# Patient Record
Sex: FEMALE | Race: WHITE | NOT HISPANIC OR LATINO | Employment: PART TIME | URBAN - METROPOLITAN AREA
[De-identification: names, ages, dates, MRNs, and addresses within clinical notes are randomized per-mention and may not be internally consistent; named-entity substitution may affect disease eponyms.]

---

## 2019-09-03 ENCOUNTER — HOSPITAL ENCOUNTER (EMERGENCY)
Facility: MEDICAL CENTER | Age: 19
End: 2019-09-04
Attending: EMERGENCY MEDICINE

## 2019-09-03 DIAGNOSIS — F19.90 IVDU (INTRAVENOUS DRUG USER): ICD-10-CM

## 2019-09-03 DIAGNOSIS — M79.89 SWELLING OF LEFT HAND: ICD-10-CM

## 2019-09-03 SDOH — HEALTH STABILITY: MENTAL HEALTH: HOW OFTEN DO YOU HAVE A DRINK CONTAINING ALCOHOL?: NEVER

## 2019-09-04 VITALS
DIASTOLIC BLOOD PRESSURE: 72 MMHG | HEIGHT: 63 IN | SYSTOLIC BLOOD PRESSURE: 106 MMHG | HEART RATE: 60 BPM | WEIGHT: 126.54 LBS | BODY MASS INDEX: 22.42 KG/M2 | OXYGEN SATURATION: 100 % | TEMPERATURE: 97.9 F | RESPIRATION RATE: 18 BRPM

## 2019-09-04 PROCEDURE — 700102 HCHG RX REV CODE 250 W/ 637 OVERRIDE(OP)

## 2019-09-04 PROCEDURE — 99284 EMERGENCY DEPT VISIT MOD MDM: CPT

## 2019-09-04 PROCEDURE — A9270 NON-COVERED ITEM OR SERVICE: HCPCS

## 2019-09-04 RX ORDER — SULFAMETHOXAZOLE AND TRIMETHOPRIM 800; 160 MG/1; MG/1
TABLET ORAL
Status: COMPLETED
Start: 2019-09-04 | End: 2019-09-04

## 2019-09-04 RX ORDER — CEPHALEXIN 250 MG/1
500 CAPSULE ORAL ONCE
Status: COMPLETED | OUTPATIENT
Start: 2019-09-04 | End: 2019-09-04

## 2019-09-04 RX ORDER — SULFAMETHOXAZOLE AND TRIMETHOPRIM 800; 160 MG/1; MG/1
1 TABLET ORAL ONCE
Status: COMPLETED | OUTPATIENT
Start: 2019-09-04 | End: 2019-09-04

## 2019-09-04 RX ORDER — CEPHALEXIN 250 MG/1
CAPSULE ORAL
Status: COMPLETED
Start: 2019-09-04 | End: 2019-09-04

## 2019-09-04 RX ORDER — SULFAMETHOXAZOLE AND TRIMETHOPRIM 800; 160 MG/1; MG/1
1 TABLET ORAL 2 TIMES DAILY
Qty: 20 TAB | Refills: 0 | Status: SHIPPED | OUTPATIENT
Start: 2019-09-04 | End: 2019-09-14

## 2019-09-04 RX ORDER — CEPHALEXIN 500 MG/1
500 CAPSULE ORAL 4 TIMES DAILY
Qty: 40 CAP | Refills: 0 | Status: SHIPPED | OUTPATIENT
Start: 2019-09-04 | End: 2019-09-14

## 2019-09-04 RX ORDER — CEPHALEXIN 250 MG/1
CAPSULE ORAL
Status: DISCONTINUED
Start: 2019-09-04 | End: 2019-09-04 | Stop reason: HOSPADM

## 2019-09-04 RX ADMIN — SULFAMETHOXAZOLE AND TRIMETHOPRIM 1 TABLET: 800; 160 TABLET ORAL at 00:27

## 2019-09-04 RX ADMIN — CEPHALEXIN 500 MG: 250 CAPSULE ORAL at 00:27

## 2019-09-04 NOTE — ED NOTES
Discharge instructions given and discussed. RX for ABX given and pt educated to take full course until complete. Pt educated to come back to ER for new or worsening symptoms, worsening  swelling and redness up the arm including streaks and follow up with PCP as instructed. Pt verbalized understanding. VSS. Pt  Discharged in stable condition.

## 2019-09-04 NOTE — ED NOTES
"Pt complains of left hand swelling after \" shooting up heroin\", left hand is swollen and red. CMS intact. Pt is unable to make fist but able to move fingers.   "

## 2019-09-04 NOTE — ED PROVIDER NOTES
"ED Provider Note    CHIEF COMPLAINT  Chief Complaint   Patient presents with   • Hand Swelling     Left        HPI    Primary care provider: No primary care provider on file.   History obtained from: Patient  History limited by: None     Phoenix Masters is a 19 y.o. female who presents to the ED with boyfriend complaining of sudden onset of left hand swelling right after injecting in the area with heroin shortly prior to arrival.  Patient states that she never shares needles.  She denies any fever/chills/shortness of breath or difficulty breathing/weakness or sensory change.  She is right-hand dominant.  She denies possibility of pregnancy.  She reports slight nausea but no vomiting.    REVIEW OF SYSTEMS  Please see HPI for pertinent positives/negatives.     PAST MEDICAL HISTORY  No past medical history on file.     SURGICAL HISTORY  History reviewed. No pertinent surgical history.     SOCIAL HISTORY  Social History     Tobacco Use   • Smoking status: Current Every Day Smoker     Packs/day: 1.00     Types: Cigarettes   • Smokeless tobacco: Never Used   Substance and Sexual Activity   • Alcohol use: Never     Frequency: Never   • Drug use: Yes     Types: Intravenous     Comment: Heroin   • Sexual activity: Not on file        FAMILY HISTORY  History reviewed. No pertinent family history.     CURRENT MEDICATIONS  Home Medications    **Home medications have not yet been reviewed for this encounter**          ALLERGIES  Allergies   Allergen Reactions   • Other Drug      minacyclin        PHYSICAL EXAM  VITAL SIGNS: /76   Pulse 71   Temp 36.6 °C (97.9 °F) (Temporal)   Resp 18   Ht 1.6 m (5' 3\")   Wt 57.4 kg (126 lb 8.7 oz)   LMP 08/31/2019   SpO2 98%   BMI 22.42 kg/m²  @TOMAS[[id number]::@     Pulse ox interpretation: 98% I interpret this pulse ox as normal     Constitutional: Well developed, well nourished, alert in no apparent distress, nontoxic appearance    HENT: No external signs of trauma, normocephalic, " oropharynx moist and clear, nose normal    Eyes: PERRL, conjunctiva without erythema, no discharge, no icterus    Neck: Soft and supple, trachea midline, no stridor, no tenderness, no LAD, no JVD, good ROM    Cardiovascular: Regular rate and rhythm, no murmurs/rubs/gallops, strong distal pulses and good perfusion    Thorax & Lungs: No respiratory distress, CTAB   Abdomen: Soft, nontender, nondistended, no guarding, no rebound, normal BS    Back: No CVAT    Extremities: No cyanosis, mild diffuse swelling of left hand without apparent tenderness to palpation/tenseness/warm/crepitus/fluctuance, no gross deformity, good ROM of left hand but with mild discomfort, no tenderness, intact distal pulses with brisk cap refill    Skin: Warm, dry, no pallor/cyanosis, no rash noted    Lymphatic: No lymphadenopathy noted    Neuro: A/O times 3, no focal deficits noted    Psychiatric: Cooperative, normal mood and affect, normal judgement, appropriate for clinical situation        DIAGNOSTIC STUDIES / PROCEDURES        LABS  All labs reviewed by me.     No results found for this or any previous visit.     RADIOLOGY  The radiologist's interpretation of all radiological studies have been reviewed by me.     No orders to display          COURSE & MEDICAL DECISION MAKING  Nursing notes, VS, PMSFHx reviewed in chart.     Review of past medical records shows the patient without prior visits to this ED.      Differential diagnoses considered include but are not limited to: Infiltration of heroin, IV drug use, cellulitis    History and physical exam as above.  This is a pleasant well-appearing patient in no acute distress and nontoxic in appearance with left hand swelling after injecting with heroin.  Although no apparent clinical signs of infection, patient is concerned about infection and given the high risk with IV drug use patient will be started on Bactrim and Keflex.  No evidence for compartment syndrome or vascular occlusion and low  clinical suspicion at this time for necrotizing fasciitis or abscess.  I discussed with patient worrisome signs and symptoms and return to ED precautions.  She was encouraged on seeking help for her drug use and outpatient follow-up.  She verbalized understanding and agreed with plan of care with no further questions or concerns.      The patient is referred to a primary physician for blood pressure management, diabetic screening, and for all other preventative health concerns.       FINAL IMPRESSION  1. Swelling of left hand Acute   2. IVDU (intravenous drug user) Active          DISPOSITION  Patient will be discharged home in stable condition.       FOLLOW UP  [address] [address]  [phone]  Call today      [facility], Emergency Dept  [address]  [facility] [id number] [phone]    If symptoms worsen         OUTPATIENT MEDICATIONS  New Prescriptions    CEPHALEXIN (KEFLEX) 500 MG CAP    Take 1 Cap by mouth 4 times a day for 10 days.    SULFAMETHOXAZOLE-TRIMETHOPRIM (BACTRIM DS) 800-160 MG TABLET    Take 1 Tab by mouth 2 times a day for 10 days.          Electronically signed by: Scot Murillo, 9/4/2019 12:01 AM      Portions of this record were made with voice recognition software.  Despite my review, spelling/grammar/context errors may still remain.  Interpretation of this chart should be taken in this context.

## 2021-05-17 ENCOUNTER — OFFICE VISIT (OUTPATIENT)
Dept: URGENT CARE | Facility: PHYSICIAN GROUP | Age: 21
End: 2021-05-17

## 2021-05-17 ENCOUNTER — HOSPITAL ENCOUNTER (OUTPATIENT)
Facility: MEDICAL CENTER | Age: 21
End: 2021-05-17
Attending: NURSE PRACTITIONER
Payer: MEDICAID

## 2021-05-17 VITALS
DIASTOLIC BLOOD PRESSURE: 70 MMHG | OXYGEN SATURATION: 96 % | RESPIRATION RATE: 14 BRPM | SYSTOLIC BLOOD PRESSURE: 112 MMHG | HEIGHT: 64 IN | WEIGHT: 150 LBS | HEART RATE: 81 BPM | TEMPERATURE: 98.2 F | BODY MASS INDEX: 25.61 KG/M2

## 2021-05-17 DIAGNOSIS — R39.15 URINARY URGENCY: ICD-10-CM

## 2021-05-17 DIAGNOSIS — N30.01 ACUTE CYSTITIS WITH HEMATURIA: ICD-10-CM

## 2021-05-17 DIAGNOSIS — R30.0 DYSURIA: ICD-10-CM

## 2021-05-17 DIAGNOSIS — R35.0 URINARY FREQUENCY: ICD-10-CM

## 2021-05-17 LAB
APPEARANCE UR: NORMAL
BILIRUB UR STRIP-MCNC: NORMAL MG/DL
COLOR UR AUTO: NORMAL
GLUCOSE UR STRIP.AUTO-MCNC: 250 MG/DL
KETONES UR STRIP.AUTO-MCNC: 15 MG/DL
LEUKOCYTE ESTERASE UR QL STRIP.AUTO: NORMAL
NITRITE UR QL STRIP.AUTO: NORMAL
PH UR STRIP.AUTO: 5 [PH] (ref 5–8)
PROT UR QL STRIP: 300 MG/DL
RBC UR QL AUTO: NORMAL
SP GR UR STRIP.AUTO: 1
UROBILINOGEN UR STRIP-MCNC: 8 MG/DL

## 2021-05-17 PROCEDURE — 81002 URINALYSIS NONAUTO W/O SCOPE: CPT | Performed by: NURSE PRACTITIONER

## 2021-05-17 PROCEDURE — 87086 URINE CULTURE/COLONY COUNT: CPT

## 2021-05-17 PROCEDURE — 99203 OFFICE O/P NEW LOW 30 MIN: CPT | Performed by: NURSE PRACTITIONER

## 2021-05-17 RX ORDER — NITROFURANTOIN 25; 75 MG/1; MG/1
100 CAPSULE ORAL EVERY 12 HOURS
Qty: 10 CAPSULE | Refills: 0 | Status: SHIPPED | OUTPATIENT
Start: 2021-05-17 | End: 2021-05-17

## 2021-05-17 RX ORDER — PHENAZOPYRIDINE HYDROCHLORIDE 200 MG/1
200 TABLET, FILM COATED ORAL 3 TIMES DAILY
Qty: 6 TABLET | Refills: 0 | Status: SHIPPED
Start: 2021-05-17 | End: 2021-05-19

## 2021-05-17 RX ORDER — PHENAZOPYRIDINE HYDROCHLORIDE 200 MG/1
200 TABLET, FILM COATED ORAL 3 TIMES DAILY
Qty: 6 TABLET | Refills: 0 | Status: SHIPPED | OUTPATIENT
Start: 2021-05-17 | End: 2021-05-17

## 2021-05-17 RX ORDER — NITROFURANTOIN 25; 75 MG/1; MG/1
100 CAPSULE ORAL EVERY 12 HOURS
Qty: 10 CAPSULE | Refills: 0 | Status: SHIPPED | OUTPATIENT
Start: 2021-05-17 | End: 2021-05-22

## 2021-05-17 ASSESSMENT — ENCOUNTER SYMPTOMS
NAUSEA: 1
FLANK PAIN: 0
CHILLS: 0
ABDOMINAL PAIN: 1
DIARRHEA: 1
MYALGIAS: 0
VOMITING: 0

## 2021-05-17 NOTE — PROGRESS NOTES
Subjective:     Phoenix Masters is a 21 y.o. female who presents for Dysuria (x 3 days)      Dysuria   This is a new problem. The current episode started in the past 7 days (3 days ago Phoenix developed dysuria, urinary frequency and urgency. ). The problem occurs every urination. The problem has been gradually worsening. The quality of the pain is described as burning. The pain is at a severity of 7/10. There has been no fever. Associated symptoms include frequency, nausea and urgency. Pertinent negatives include no chills, flank pain, hematuria, possible pregnancy or vomiting. Treatments tried: Azo. The treatment provided mild relief.         Review of Systems   Constitutional: Negative for chills.   Gastrointestinal: Positive for abdominal pain, diarrhea and nausea. Negative for vomiting.   Genitourinary: Positive for dysuria, frequency and urgency. Negative for flank pain and hematuria.   Musculoskeletal: Negative for myalgias.       PMH: History reviewed. No pertinent past medical history.  ALLERGIES:   Allergies   Allergen Reactions   • Minocycline Hives   • Other Drug      minacyclin     SURGHX: History reviewed. No pertinent surgical history.  SOCHX:   Social History     Socioeconomic History   • Marital status: Single     Spouse name: Not on file   • Number of children: Not on file   • Years of education: Not on file   • Highest education level: Not on file   Occupational History   • Not on file   Tobacco Use   • Smoking status: Current Every Day Smoker     Packs/day: 1.00     Types: Cigarettes   • Smokeless tobacco: Never Used   Substance and Sexual Activity   • Alcohol use: Never   • Drug use: Yes     Types: Intravenous     Comment: Heroin   • Sexual activity: Not on file   Other Topics Concern   • Not on file   Social History Narrative   • Not on file     Social Determinants of Health     Financial Resource Strain:    • Difficulty of Paying Living Expenses:    Food Insecurity:    • Worried About Running  "Out of Food in the Last Year:    • Ran Out of Food in the Last Year:    Transportation Needs:    • Lack of Transportation (Medical):    • Lack of Transportation (Non-Medical):    Physical Activity:    • Days of Exercise per Week:    • Minutes of Exercise per Session:    Stress:    • Feeling of Stress :    Social Connections:    • Frequency of Communication with Friends and Family:    • Frequency of Social Gatherings with Friends and Family:    • Attends Oriental orthodox Services:    • Active Member of Clubs or Organizations:    • Attends Club or Organization Meetings:    • Marital Status:    Intimate Partner Violence:    • Fear of Current or Ex-Partner:    • Emotionally Abused:    • Physically Abused:    • Sexually Abused:      FH: History reviewed. No pertinent family history.      Objective:   /70   Pulse 81   Temp 36.8 °C (98.2 °F)   Resp 14   Ht 1.626 m (5' 4\")   Wt 68 kg (150 lb)   SpO2 96%   BMI 25.75 kg/m²     Physical Exam  Vitals and nursing note reviewed.   Constitutional:       General: She is not in acute distress.     Appearance: Normal appearance. She is not ill-appearing.   HENT:      Head: Normocephalic and atraumatic.      Right Ear: External ear normal.      Left Ear: External ear normal.      Nose: No congestion or rhinorrhea.      Mouth/Throat:      Mouth: Mucous membranes are moist.   Eyes:      Extraocular Movements: Extraocular movements intact.      Pupils: Pupils are equal, round, and reactive to light.   Cardiovascular:      Rate and Rhythm: Normal rate and regular rhythm.      Pulses: Normal pulses.      Heart sounds: Normal heart sounds.   Pulmonary:      Effort: Pulmonary effort is normal.      Breath sounds: Normal breath sounds.   Abdominal:      General: Abdomen is flat. Bowel sounds are normal.      Palpations: Abdomen is soft.      Tenderness: There is abdominal tenderness. There is no right CVA tenderness or left CVA tenderness.   Musculoskeletal:         General: Normal " range of motion.      Cervical back: Normal range of motion and neck supple.   Skin:     General: Skin is warm and dry.      Capillary Refill: Capillary refill takes less than 2 seconds.   Neurological:      General: No focal deficit present.      Mental Status: She is alert and oriented to person, place, and time. Mental status is at baseline.   Psychiatric:         Mood and Affect: Mood normal.         Behavior: Behavior normal.         Thought Content: Thought content normal.         Judgment: Judgment normal.       POCT urine: AZO  Assessment/Plan:   Assessment    1. Acute cystitis with hematuria  Urine Culture    nitrofurantoin (MACROBID) 100 MG Cap   2. Dysuria  POCT Urinalysis    phenazopyridine (PYRIDIUM) 200 MG Tab   3. Urinary frequency  POCT Urinalysis    phenazopyridine (PYRIDIUM) 200 MG Tab   4. Urinary urgency  POCT Urinalysis    phenazopyridine (PYRIDIUM) 200 MG Tab     Urine sent for culture and I will call her with her results. Prescription was sent in to preferred pharmacy. AVS was given and reviewed with patient. Patient educated on red flags of UTI and encouraged to seek care back in UC or ER for  fever, chills, flank pain, or worsening symptoms.

## 2021-05-17 NOTE — LETTER
May 17, 2021    To Whom It May Concern:         This is confirmation that Phoenix Masters attended her scheduled appointment with BRANDON Almazan on 5/17/21. She may return to work 5/18/2021.          If you have any questions please do not hesitate to call me at the phone number listed below.    Sincerely,          DENZEL Almazan.  [phone]

## 2021-05-19 ENCOUNTER — TELEPHONE (OUTPATIENT)
Dept: URGENT CARE | Facility: PHYSICIAN GROUP | Age: 21
End: 2021-05-19

## 2021-05-19 LAB
BACTERIA UR CULT: NORMAL
SIGNIFICANT IND 70042: NORMAL
SITE SITE: NORMAL
SOURCE SOURCE: NORMAL

## 2021-06-29 ENCOUNTER — HOSPITAL ENCOUNTER (EMERGENCY)
Facility: MEDICAL CENTER | Age: 21
End: 2021-06-29
Attending: EMERGENCY MEDICINE
Payer: MEDICAID

## 2021-06-29 VITALS
WEIGHT: 148.81 LBS | HEART RATE: 91 BPM | SYSTOLIC BLOOD PRESSURE: 95 MMHG | TEMPERATURE: 98.1 F | OXYGEN SATURATION: 95 % | RESPIRATION RATE: 14 BRPM | DIASTOLIC BLOOD PRESSURE: 60 MMHG | HEIGHT: 64 IN | BODY MASS INDEX: 25.41 KG/M2

## 2021-06-29 DIAGNOSIS — L02.91 ABSCESS: ICD-10-CM

## 2021-06-29 LAB
GRAM STN SPEC: NORMAL
SIGNIFICANT IND 70042: NORMAL
SITE SITE: NORMAL
SOURCE SOURCE: NORMAL

## 2021-06-29 PROCEDURE — A9270 NON-COVERED ITEM OR SERVICE: HCPCS | Performed by: EMERGENCY MEDICINE

## 2021-06-29 PROCEDURE — 99283 EMERGENCY DEPT VISIT LOW MDM: CPT

## 2021-06-29 PROCEDURE — 90471 IMMUNIZATION ADMIN: CPT

## 2021-06-29 PROCEDURE — 87147 CULTURE TYPE IMMUNOLOGIC: CPT

## 2021-06-29 PROCEDURE — 90715 TDAP VACCINE 7 YRS/> IM: CPT | Performed by: EMERGENCY MEDICINE

## 2021-06-29 PROCEDURE — 700102 HCHG RX REV CODE 250 W/ 637 OVERRIDE(OP): Performed by: EMERGENCY MEDICINE

## 2021-06-29 PROCEDURE — 700101 HCHG RX REV CODE 250: Performed by: EMERGENCY MEDICINE

## 2021-06-29 PROCEDURE — 304217 HCHG IRRIGATION SYSTEM

## 2021-06-29 PROCEDURE — 87070 CULTURE OTHR SPECIMN AEROBIC: CPT

## 2021-06-29 PROCEDURE — 700111 HCHG RX REV CODE 636 W/ 250 OVERRIDE (IP): Performed by: EMERGENCY MEDICINE

## 2021-06-29 PROCEDURE — 87205 SMEAR GRAM STAIN: CPT

## 2021-06-29 PROCEDURE — 87077 CULTURE AEROBIC IDENTIFY: CPT | Mod: 91

## 2021-06-29 PROCEDURE — 303977 HCHG I & D

## 2021-06-29 PROCEDURE — 87186 SC STD MICRODIL/AGAR DIL: CPT

## 2021-06-29 RX ORDER — SULFAMETHOXAZOLE AND TRIMETHOPRIM 800; 160 MG/1; MG/1
1 TABLET ORAL ONCE
Status: COMPLETED | OUTPATIENT
Start: 2021-06-29 | End: 2021-06-29

## 2021-06-29 RX ORDER — LIDOCAINE HYDROCHLORIDE 20 MG/ML
20 INJECTION, SOLUTION INFILTRATION; PERINEURAL ONCE
Status: COMPLETED | OUTPATIENT
Start: 2021-06-29 | End: 2021-06-29

## 2021-06-29 RX ORDER — SULFAMETHOXAZOLE AND TRIMETHOPRIM 800; 160 MG/1; MG/1
1 TABLET ORAL 2 TIMES DAILY
Qty: 14 TABLET | Refills: 0 | Status: SHIPPED | OUTPATIENT
Start: 2021-06-29 | End: 2021-07-06

## 2021-06-29 RX ADMIN — CLOSTRIDIUM TETANI TOXOID ANTIGEN (FORMALDEHYDE INACTIVATED), CORYNEBACTERIUM DIPHTHERIAE TOXOID ANTIGEN (FORMALDEHYDE INACTIVATED), BORDETELLA PERTUSSIS TOXOID ANTIGEN (GLUTARALDEHYDE INACTIVATED), BORDETELLA PERTUSSIS FILAMENTOUS HEMAGGLUTININ ANTIGEN (FORMALDEHYDE INACTIVATED), BORDETELLA PERTUSSIS PERTACTIN ANTIGEN, AND BORDETELLA PERTUSSIS FIMBRIAE 2/3 ANTIGEN 0.5 ML: 5; 2; 2.5; 5; 3; 5 INJECTION, SUSPENSION INTRAMUSCULAR at 06:35

## 2021-06-29 RX ADMIN — LIDOCAINE HYDROCHLORIDE 20 ML: 20 INJECTION, SOLUTION INFILTRATION; PERINEURAL at 06:30

## 2021-06-29 RX ADMIN — SULFAMETHOXAZOLE AND TRIMETHOPRIM 1 TABLET: 800; 160 TABLET ORAL at 06:34

## 2021-06-29 NOTE — LETTER
7/1/2021               Phoenix Masters   [place] [address]        Dear Phoenix (MR#[id number])    This letter is sent in regards to your recent visit to the [facility] Emergency Department on 6/29/2021. During the visit, tests were performed to assist the physician in your medical diagnosis. A review of your tests requires that we notify you of the following:    Your wound culture was POSITIVE for a bacteria called Methicillin Resistant Staphylococcus aureus (MRSA). The antibiotic prescribed for you (sulfamethoxazole-trimethoprim) should be active to treat this bacteria. It is important that you continue taking your antibiotic until it is finished.     Please feel free to contact me at the number below if you have any questions or concerns. Thank you for your cooperation in the matter.    Sincerely,  ED Culture Follow-Up Staff  Bette Yuen, PharmD    [facility], Emergency Department  [address] [place] [id number] [phone] (ED Culture Line)

## 2021-06-29 NOTE — ED PROVIDER NOTES
"ED Provider Note    CHIEF COMPLAINT  Chief Complaint   Patient presents with   • Arm Pain     Patient report of redness, swelling and pain to the right antecubital the past 5-6 days   • Arm Swelling   • Leg Problem     redness and slight pain to the right lateral thigh the past 2-3 days.         HPI Phoenix Masters is a 21 y.o. female who presents to the Emergency Department with a history of IV drug abuse with heroin, as well as hepatitis C presents to the emergency department with erythema and swelling in her right antecubital region, there is no associated lymphangitis there is no associated fever she denies any other acute symptoms.  She states that the last use was today, she does not believe she is up-to-date on her tetanus    REVIEW OF SYSTEMS  As above, all other systems negative.  PAST MEDICAL HISTORY   has a past medical history of Hepatitis C.    SOCIAL HISTORY  Social History     Tobacco Use   • Smoking status: Current Every Day Smoker     Packs/day: 1.00     Types: Cigarettes   • Smokeless tobacco: Never Used   Vaping Use   • Vaping Use: Never assessed   Substance and Sexual Activity   • Alcohol use: Never   • Drug use: Yes     Types: Intravenous     Comment: Heroin last use 06/29/2021   • Sexual activity: Not on file       SURGICAL HISTORY  patient denies any surgical history    CURRENT MEDICATIONS  Reviewed.  See Encounter Summary.  Include none    ALLERGIES  Allergies   Allergen Reactions   • Minocycline Hives       PHYSICAL EXAM  VITAL SIGNS: BP (!) 95/60   Pulse 91   Temp 36.7 °C (98.1 °F) (Oral)   Resp 14   Ht 1.626 m (5' 4\")   Wt 67.5 kg (148 lb 13 oz)   LMP 06/26/2021   SpO2 95%   BMI 25.54 kg/m²   Constitutional: Afebrile, pleasant, Alert in no apparent distress.  HENT: Normocephalic, Bilateral external ears normal. Nose normal.   Eyes: Pupils are equal and reactive. Conjunctiva normal, non-icteric.   Thorax & Lungs: Easy unlabored respirations  Abdomen:  No gross signs of peritonitis, " no pain with movement   Skin: Patient has a raised fluctuant erythematous and erythematous lesion her right antecubital region that is the size of approximately 3 cm circumferential  Extremities:   Multiple track marks, scattered bruising  Neurologic: Alert, Grossly non-focal.   Psychiatric: Affect and Mood normal      COURSE & MEDICAL DECISION MAKING  Nursing notes and vital signs were reviewed. (See chart for details)    The patient presents to the Emergency Department with what appears to be an abscess of her right antecubital region.  There is no signs of systemic toxicity.  I discussed with her incision and drainage, I will give him Bactrim for MRSA prophylaxis, will culture the wound, and update her tetanus.    INCISION AND DRAINAGE PROCEDURE NOTE:  Patient identification was confirmed and consent was obtained.  This procedure was performed by Dr. Correa  Site: Right AC  Needle size: 27  Anesthetic used (type and amt): lidocaine 2%  Blade size: 11  Drainage: 5 cc  Packing used  Site anesthetized, incision made over site, wound drained and explored loculations, rinsed with copious amounts of normal saline, wound packed with sterile gauze, covered with dry, sterile dressing. Pt tolerated procedure well without complications. Instructions for care discussed verbally and pt provided with additional written instructions for homecare and f/u.    Patient has been instructed to have packing material removed and evaluation for for repacking in 2 days, return for fever increasing redness increasing pain    Culture has been sent    The patient was discharged home with an information sheet on abscess care and told to return immediately for any signs or symptoms listed, or any unexpected symptoms.  The patient verbally agreed to the discharge precautions and follow-up plan which is documented in EPIC.  DISPOSITION:    Patient will be discharged home in stable condition.    FOLLOW UP:  [facility],  Emergency Dept  [address]  [place] [address]  [phone]  In 2 days        OUTPATIENT MEDICATIONS:  New Prescriptions    SULFAMETHOXAZOLE-TRIMETHOPRIM (BACTRIM DS) 800-160 MG TABLET    Take 1 tablet by mouth 2 times a day for 7 days.         FINAL IMPRESSION   1. Abscess

## 2021-07-01 LAB
BACTERIA WND AEROBE CULT: ABNORMAL
BACTERIA WND AEROBE CULT: ABNORMAL
GRAM STN SPEC: ABNORMAL
SIGNIFICANT IND 70042: ABNORMAL
SITE SITE: ABNORMAL
SOURCE SOURCE: ABNORMAL

## 2021-07-01 NOTE — ED NOTES
ED Positive Culture Follow-up/Notification Note:    Date: 7/1/21     Patient seen in the ED on 6/29/2021 for erythema and swelling in her right antecubital region. Patient underwent an I&D in the ED. Patient received Bactrim DS 1 tab PO x 1 in the ED.      1. Abscess       Discharge Medication List as of 6/29/2021  6:41 AM      START taking these medications    Details   sulfamethoxazole-trimethoprim (BACTRIM DS) 800-160 MG tablet Take 1 tablet by mouth 2 times a day for 7 days., Disp-14 tablet, R-0, Print Rx Paper             Allergies: Minocycline     Vitals:    06/29/21 0520 06/29/21 0530 06/29/21 0540 06/29/21 0550   BP: 104/78   (!) 95/60   Pulse: 92 100 81 91   Resp: 13 14 13 14   Temp:       TempSrc:       SpO2: 95% 95% 93% 95%   Weight:       Height:           Final cultures:   Results     Procedure Component Value Units Date/Time    CULTURE WOUND W/ GRAM STAIN [[id number]]  (Abnormal)  (Susceptibility) Collected: 06/29/21 0638    Order Status: Completed Specimen: Wound from Abscess Updated: 07/01/21 0800     Significant Indicator POS     Source WND     Site arm     Culture Result -     Gram Stain Result Rare WBCs.  Rare Gram positive cocci.       Culture Result Methicillin Resistant Staphylococcus aureus  Moderate growth      Narrative:      CALL  Garcia  [facility] tel. [phone],  CALLED  [facility] tel. [phone] 06/30/2021, 11:44, RB PERF. RESULTS CALLED  TO:[phone]    Susceptibility     Methicillin resistant staphylococcus aureus (1)     Antibiotic Interpretation Microscan Method Status    Ampicillin  [*]  Resistant >8 mcg/mL MARY ELLEN Final    Amoxicillin/CA  [*]  Resistant <=4/2 mcg/mL MARY ELLEN Final    Azithromycin Resistant >4 mcg/mL MARY ELLEN Final    Ceftriaxone  [*]  Resistant 8 mcg/mL MARY ELLEN Final    Clindamycin Resistant >4 mcg/mL MARY ELLEN Final    Cephalothin  [*]  Resistant <=8 mcg/mL MARY ELLEN Final    Cefoxitin Screen  [*]  Positive >4 mcg/mL MARY ELLEN Final    Cefazolin Resistant <=8 mcg/mL MARY ELLEN Final    Ciprofloxacin  [*]  Resistant >2  mcg/mL MARY ELLEN Final    Cefepime Resistant 8 mcg/mL MARY ELLEN Final    Ceftaroline Sensitive <=0.5 mcg/mL MARY ELLEN Final    Daptomycin Sensitive 1 mcg/mL MARY ELLEN Final    Ampicillin/sulbactam Resistant 16/8 mcg/mL MARY ELLEN Final    Erythromycin Resistant >4 mcg/mL MARY ELLEN Final    Vancomycin Sensitive 1 mcg/mL MARY ELLEN Final    Gentamicin  [*]  Sensitive <=4 mcg/mL MARY ELLEN Final    Imipenem  [*]  Resistant <=4 mcg/mL MARY ELLEN Final    Levofloxacin  [*]  Intermediate 4 mcg/mL MARY ELLEN Final    Linezolid  [*]  Sensitive 2 mcg/mL MARY ELLEN Final    Meropenem  [*]  Resistant <=2 mcg/mL MARY ELLEN Final    Oxacillin Resistant >2 mcg/mL MARY ELLEN Final    Rifampin  [*]  Sensitive <=1 mcg/mL MARY ELLEN Final    Synercid  [*]  Sensitive 1 mcg/mL MARY ELLEN Final    Trimeth/Sulfa Sensitive <=0.5/9.5 mcg/mL MARY ELLEN Final    Tetracycline Sensitive <=4 mcg/mL MARY ELLEN Final    Tigecycline  [*]  Sensitive <=0.25 mcg/mL MARY ELLEN Final           [*]  Suppressed Antibiotic                 GRAM STAIN [[id number]] Collected: 06/29/21 0638    Order Status: Completed Specimen: Wound Updated: 06/29/21 2224     Significant Indicator .     Source WND     Site arm     Gram Stain Result Rare WBCs.  Rare Gram positive cocci.            Plan:   Appropriate antibiotic therapy prescribed. No changes required based upon culture result.  Sent letter to patient to notify of positive culture result and encourage compliance with prescribed antibiotics.     Bette Yuen, PharmD

## 2021-07-02 ENCOUNTER — HOSPITAL ENCOUNTER (EMERGENCY)
Facility: MEDICAL CENTER | Age: 21
End: 2021-07-02
Payer: MEDICAID

## 2021-07-02 VITALS
DIASTOLIC BLOOD PRESSURE: 66 MMHG | RESPIRATION RATE: 19 BRPM | HEART RATE: 82 BPM | HEIGHT: 64 IN | SYSTOLIC BLOOD PRESSURE: 99 MMHG | OXYGEN SATURATION: 95 % | WEIGHT: 146.83 LBS | BODY MASS INDEX: 25.07 KG/M2 | TEMPERATURE: 97.4 F

## 2021-07-02 DIAGNOSIS — L02.91 ABSCESS: ICD-10-CM

## 2021-07-02 DIAGNOSIS — Z51.89 ENCOUNTER FOR WOUND RE-CHECK: ICD-10-CM

## 2021-07-02 PROCEDURE — 99282 EMERGENCY DEPT VISIT SF MDM: CPT | Mod: 25

## 2021-07-02 PROCEDURE — 303977 HCHG I & D

## 2021-07-02 NOTE — ED PROVIDER NOTES
"ED Provider Note    CHIEF COMPLAINT  Chief Complaint   Patient presents with   • Wound Check       HPI  Patient is a 21-year-old female who has a history of prior IV drug use and prior hepatitis C diagnosis who presented to the emergency room on 6/29 for findings consistent with an abscess.  At that time she was seen by the ER physician and incision and drainage procedure was performed and packed with a iodoform dressing.  Wound culture had been sent and she was started on Bactrim for MRSA prophylaxis.  She now presents the emergency room for repeat wound check on the right forearm crease.  She has had some discharge but reports the overall redness has decreased.  She states that a prior abscess on her right leg which was small earlier has now enlarged and feels like there is a developing fluid collection underneath.  She denies recent fevers or chills    PPE Note: I personally donned full PPE for all patient encounters during this visit, including being clean-shaven with an N95 respirator mask, gloves, and goggles.     REVIEW OF SYSTEMS  See HPI for further details. All other systems are negative.     PAST MEDICAL HISTORY   has a past medical history of Hepatitis C.    SOCIAL HISTORY  Social History     Tobacco Use   • Smoking status: Current Every Day Smoker     Packs/day: 1.00     Types: Cigarettes   • Smokeless tobacco: Never Used   Vaping Use   • Vaping Use: Never assessed   Substance and Sexual Activity   • Alcohol use: Never   • Drug use: Yes     Types: Intravenous     Comment: Heroin last use 06/29/2021   • Sexual activity: Not on file       SURGICAL HISTORY  patient denies any surgical history    CURRENT MEDICATIONS  Home Medications    **Home medications have not yet been reviewed for this encounter**         ALLERGIES  Allergies   Allergen Reactions   • Minocycline Hives       PHYSICAL EXAM  VITAL SIGNS: BP (!) 99/66   Pulse 82   Temp 36.3 °C (97.4 °F) (Temporal)   Resp 19   Ht 1.626 m (5' 4\")   Wt " 66.6 kg (146 lb 13.2 oz)   LMP 06/26/2021   SpO2 95%   BMI 25.20 kg/m²   Pulse ox interpretation: I interpret this pulse ox as normal.  Constitutional: NAD, afebrile, non-toxic  HENT: Normocephalic, Bilateral external ears normal. Nose normal.   Eyes: Pupils are equal and reactive. Conjunctiva normal, non-icteric.   Thorax & Lungs: Easy unlabored respirations  Abdomen:  No gross signs of peritonitis, no pain with movement   Skin: Patient has a slight erythematous and erythematous lesion with open drain and wound packing present.  Right upper lateral thigh has a 3 cm x 3 cm indurated lesion with central fluctuance.    Extremities:   Multiple track marks, scattered bruising  Neurologic: Alert, Grossly non-focal.   Psychiatric: Affect and Mood normal    DIAGNOSTIC STUDIES / PROCEDURES    INCISION AND DRAINAGE PROCEDURE NOTE:  Patient identification was confirmed and consent was obtained.  This procedure was performed by Dr. Schroeder  Site: Right lateral thigh  Needle size: 27  Anesthetic used: lidocaine 2% with epi  Blade size: 11  Drainage: 3 cc  Packing used  Site anesthetized, incision made over site, wound drained and explored loculations, rinsed with copious amounts of normal saline, wound packed with sterile gauze, covered with dry, sterile dressing. Pt tolerated procedure well without complications. Instructions for care discussed verbally and pt provided with additional written instructions for homecare and f/u.     Patient has been instructed to have packing material removed and evaluation in 2 days, return for fever increasing redness increasing pain    COURSE & MEDICAL DECISION MAKING  Pertinent Labs & Imaging studies reviewed. (See chart for details)    MDM    Initial evaluation at 0501:  Patient was seen and evaluated here in the emergency department for both the wound check on her right antecubital fossa.  There was a open wound with packing that was removed and repacked.  Granulation tissues noted, no  areas of developing loculations were observed.  She has been compliant with her Bactrim medication and expressed concern regarding a wound site on her right lateral thigh.  Bedside ultrasound confirms a small fluid collection in this area of induration and fluctuance and incision and drainage procedure was performed per my note above.  She is given very strict return precautions, follow-up wound check instructions and is discharged home in stable condition.    FINAL IMPRESSION  Visit Diagnoses     ICD-10-CM   1. Encounter for wound re-check  Z51.89   2. Abscess  L02.91        Electronically signed by: Scout Schroeder M.D., 7/2/2021 5:01 AM

## 2021-07-02 NOTE — ED TRIAGE NOTES
Patient presents with reports of needing follow up wound evaluation and packing to be removed after have an I&D of an abscess in her RUE. Denies any fever, chills and improved redness. Has been taking antibiotics as prescribed.

## 2022-02-12 ENCOUNTER — HOSPITAL ENCOUNTER (EMERGENCY)
Facility: MEDICAL CENTER | Age: 22
End: 2022-02-12
Attending: EMERGENCY MEDICINE
Payer: MEDICAID

## 2022-02-12 VITALS
DIASTOLIC BLOOD PRESSURE: 68 MMHG | BODY MASS INDEX: 25.22 KG/M2 | HEIGHT: 64 IN | HEART RATE: 78 BPM | RESPIRATION RATE: 18 BRPM | SYSTOLIC BLOOD PRESSURE: 110 MMHG | WEIGHT: 147.71 LBS | TEMPERATURE: 97.5 F | OXYGEN SATURATION: 100 %

## 2022-02-12 DIAGNOSIS — K08.89 PAIN, DENTAL: ICD-10-CM

## 2022-02-12 PROCEDURE — 99282 EMERGENCY DEPT VISIT SF MDM: CPT

## 2022-02-12 RX ORDER — BUPRENORPHINE 8 MG/1
16 TABLET SUBLINGUAL DAILY
COMMUNITY

## 2022-02-12 RX ORDER — CLINDAMYCIN HYDROCHLORIDE 150 MG/1
150 CAPSULE ORAL 3 TIMES DAILY
Qty: 30 CAPSULE | Refills: 0 | Status: SHIPPED | OUTPATIENT
Start: 2022-02-12

## 2022-02-12 RX ORDER — TRAZODONE HYDROCHLORIDE 50 MG/1
50 TABLET ORAL NIGHTLY
COMMUNITY

## 2022-02-12 RX ORDER — OXYCODONE HYDROCHLORIDE AND ACETAMINOPHEN 5; 325 MG/1; MG/1
1 TABLET ORAL EVERY 6 HOURS PRN
Qty: 12 TABLET | Refills: 0 | Status: SHIPPED | OUTPATIENT
Start: 2022-02-12 | End: 2022-02-12 | Stop reason: SDUPTHER

## 2022-02-12 RX ORDER — OXYCODONE HYDROCHLORIDE AND ACETAMINOPHEN 5; 325 MG/1; MG/1
1 TABLET ORAL EVERY 6 HOURS PRN
Qty: 12 TABLET | Refills: 0 | Status: SHIPPED | OUTPATIENT
Start: 2022-02-12 | End: 2022-02-15

## 2022-02-12 RX ORDER — CLINDAMYCIN HYDROCHLORIDE 150 MG/1
150 CAPSULE ORAL 3 TIMES DAILY
Qty: 30 CAPSULE | Refills: 0 | Status: SHIPPED | OUTPATIENT
Start: 2022-02-12 | End: 2022-02-12 | Stop reason: SDUPTHER

## 2022-02-13 NOTE — ED PROVIDER NOTES
ED Provider Note    CHIEF COMPLAINT  Chief Complaint   Patient presents with   • Dental Pain     L lower jaw/molar pain the last 3 days for this flare up however has had issues with same        HPI  Phoenix Masters is a 21 y.o. female here for evaluation of left lower dental pain.  Patient states she has a cavity broken tooth in that area, some causing her some pain.  She has no fever chills or vomiting.  And no other medical concerns at this time.  She did use Orajel with some relief.  Patient states that she has had pain over the last couple of days.  She does not have a dentist that she sees.  She is able to tolerate secretions, and does not have any vomiting, fever or chills.  Nothing seem to leave exacerbate any of her symptoms.  She is not on antibiotics.      ROS  See HPI for further details, o/w negative.     PAST MEDICAL HISTORY   has a past medical history of Hepatitis C.    SOCIAL HISTORY  Social History     Tobacco Use   • Smoking status: Current Every Day Smoker     Packs/day: 1.00     Types: Cigarettes   • Smokeless tobacco: Never Used   Vaping Use   • Vaping Use: Not on file   Substance and Sexual Activity   • Alcohol use: Yes     Comment: rare   • Drug use: Not Currently     Comment: last 11/2021   • Sexual activity: Not on file       Family History  No bleeding disorders    SURGICAL HISTORY  patient denies any surgical history    CURRENT MEDICATIONS  Home Medications     Reviewed by Maya Borja R.N. (Registered Nurse) on 02/12/22 at 5463  Med List Status: Partial   Medication Last Dose Status   buprenorphine (SUBUTEX) 8 MG SL Tab 2/12/2022 Active   traZODone (DESYREL) 50 MG Tab 2/11/2022 Active                ALLERGIES  Allergies   Allergen Reactions   • Minocycline Hives       REVIEW OF SYSTEMS  See HPI for further details. Review of systems as above, otherwise all other systems are negative.     PHYSICAL EXAM  Constitutional: Well developed, well nourished. No acute distress.  HEENT:  Normocephalic, atraumatic. Posterior pharynx clear.  Poor dentition, fractured left lower molar.  No abscess.  Musculoskeletal: No deformity, no edema, neurovascular intact.   Neuro: Clear speech, appropriate, cooperative, cranial nerves II-XII grossly intact.  Psych: Normal mood and affect    PROCEDURES     MEDICAL RECORD  I have reviewed patient's medical record and pertinent results are listed.    COURSE & MEDICAL DECISION MAKING  I have reviewed any medical record information, laboratory studies and radiographic results as noted above.    10:23 PM  Patient is nontoxic-appearing and afebrile.  She will  her antibiotics tonight, and pain medications.  She will return for any further issues or concerns.  Patient is tolerating p.o. fluids and has no drooling.      If you have had any blood pressure issues while here in the emergency department, please see your doctor for a further evaluation or work up.    Differential diagnoses include but not limited to: Dental pain, abscess.    This patient presents with dental pain.  At this time, I have counseled the patient/family regarding their medications, pain control, and follow up.  They will continue their medications, if any, as prescribed.  They will return immediately for any worsening symptoms and/or any other medical concerns.  They will see their doctor, or contact the doctor provided, in 1-2 days for follow up.       FINAL IMPRESSION  Dental pain      Electronically signed by: Bryn Sullivan D.O., 2/12/2022 10:16 PM

## 2022-02-13 NOTE — ED TRIAGE NOTES
Pt comes c/o L L jaw pain molar cracked  Has been flaring up the last several months on and off  This flare up started 3 days ago  Has not been able to get with dentist

## 2022-12-03 ENCOUNTER — HOSPITAL ENCOUNTER (EMERGENCY)
Facility: MEDICAL CENTER | Age: 22
End: 2022-12-03
Attending: EMERGENCY MEDICINE
Payer: MEDICAID

## 2022-12-03 VITALS
HEIGHT: 64 IN | HEART RATE: 79 BPM | RESPIRATION RATE: 18 BRPM | DIASTOLIC BLOOD PRESSURE: 77 MMHG | WEIGHT: 134.7 LBS | OXYGEN SATURATION: 95 % | TEMPERATURE: 97.4 F | SYSTOLIC BLOOD PRESSURE: 102 MMHG | BODY MASS INDEX: 23 KG/M2

## 2022-12-03 DIAGNOSIS — L03.115 CELLULITIS OF RIGHT LOWER EXTREMITY: ICD-10-CM

## 2022-12-03 PROCEDURE — 99283 EMERGENCY DEPT VISIT LOW MDM: CPT

## 2022-12-03 PROCEDURE — 700102 HCHG RX REV CODE 250 W/ 637 OVERRIDE(OP): Performed by: EMERGENCY MEDICINE

## 2022-12-03 PROCEDURE — A9270 NON-COVERED ITEM OR SERVICE: HCPCS | Performed by: EMERGENCY MEDICINE

## 2022-12-03 RX ORDER — CLINDAMYCIN HYDROCHLORIDE 300 MG/1
300 CAPSULE ORAL 3 TIMES DAILY
Qty: 21 CAPSULE | Refills: 0 | Status: SHIPPED | OUTPATIENT
Start: 2022-12-03 | End: 2022-12-10

## 2022-12-03 RX ORDER — CLINDAMYCIN HYDROCHLORIDE 150 MG/1
300 CAPSULE ORAL ONCE
Status: COMPLETED | OUTPATIENT
Start: 2022-12-03 | End: 2022-12-03

## 2022-12-03 RX ORDER — FLUCONAZOLE 150 MG/1
150 TABLET ORAL DAILY
Qty: 1 TABLET | Refills: 0 | Status: SHIPPED | OUTPATIENT
Start: 2022-12-03 | End: 2022-12-04

## 2022-12-03 RX ADMIN — CLINDAMYCIN HYDROCHLORIDE 300 MG: 150 CAPSULE ORAL at 16:33

## 2022-12-03 NOTE — Clinical Note
Phoenix Masters was seen and treated in our emergency department on 12/3/2022.  She may return to work on 12/05/2022.       If you have any questions or concerns, please don't hesitate to call.      William Mcintosh M.D.

## 2022-12-03 NOTE — ED PROVIDER NOTES
"ED Provider Note    CHIEF COMPLAINT  Chief Complaint   Patient presents with    Wound Check     To R knee. Started as \"razor bump\" per pt. Pt. Not currently on antibx.        HPI Phoenix Masters is a 22 y.o. female who presents for evaluation of a painful lesion on the anterior portion of her right knee.  Patient notes the lesion started as a \"razor bump\" 2 days ago and has been progressively worsening in terms of redness, inflammation, and pain.  She notes no drainage and has had no other injuries in the area.  She is able to bend her knee and bear weight however direct palpation of the lesion is very painful.  Patient notes a history of MRSA and has been on clindamycin in the past which she has tolerated well.    REVIEW OF SYSTEMS  Constitutional: No fevers or chills  Skin: Redness and swelling as detailed above  HEENT: No sore throat, or runny nose  Neck: No neck pain  Chest: No pain   Pulm: No shortness of breath, or cough  Gastrointestinal: No nausea, vomiting, diarrhea,  or abdominal pain.  Musculoskeletal: No recent trauma.  Erythema, swelling, and tenderness noted to the anterior portion of right knee  Neurologic: No sensory or motor changes to affected extremity  Heme: No bleeding or bruising problems.   Immuno: History of MRSA and hep C    PAST FAM HISTORY  History reviewed. No pertinent family history.    PAST MEDICAL HISTORY   has a past medical history of Hepatitis C.    SOCIAL HISTORY  Social History     Tobacco Use    Smoking status: Every Day     Packs/day: 1.00     Types: Cigarettes    Smokeless tobacco: Never   Vaping Use    Vaping Use: Not on file   Substance and Sexual Activity    Alcohol use: Yes     Comment: 2x/week    Drug use: Not Currently     Comment: last 11/2021    Sexual activity: Not on file       SURGICAL HISTORY  patient denies any surgical history    CURRENT MEDICATIONS  Home Medications       Reviewed by Sera Mendieta R.N. (Registered Nurse) on 12/03/22 at 1541  Med List " "Status: Not Addressed     Medication Last Dose Status   buprenorphine (SUBUTEX) 8 MG SL Tab  Active   clindamycin (CLEOCIN) 150 MG Cap  Active   traZODone (DESYREL) 50 MG Tab  Active                    ALLERGIES  Allergies   Allergen Reactions    Minocycline Hives       PHYSICAL EXAM  VITAL SIGNS: /77   Pulse 79   Temp 36.3 °C (97.4 °F) (Temporal)   Resp 18   Ht 1.626 m (5' 4\")   Wt 61.1 kg (134 lb 11.2 oz)   LMP 12/01/2022   SpO2 95%   BMI 23.12 kg/m²    Gen: Alert in no apparent distress.  HEENT: No signs of trauma, Bilateral external ears normal, Nose normal. Conjunctiva normal, Non-icteric.   Cardiovascular: Regular rate and rhythm. Capillary refill less than 3 seconds to all extremities, 2+ distal pulses.  Thorax & Lungs: No tachypnea or increased work of breathing  Skin: Erythema tenderness, and swelling as noted in picture below.  Warm, Dry.  Extremities: Intact distal pulses, No pitting edema.  Approximately 3 cm in diameter area of fairly well demarcated erythema induration, and tenderness centered over the proximal anterior tibia tuberosity.  There is no drainage or fluctuance.  Patient has no calf tenderness and is able to range the knee without distress.  There is no knee effusion and patella is free-floating.  Neurologic: Alert , no facial droop, grossly normal coordination and strength  Psychiatric: Affect pleasant        LABS  Results for orders placed or performed during the hospital encounter of 06/29/21   CULTURE WOUND W/ GRAM STAIN    Specimen: Abscess; Wound   Result Value Ref Range    Significant Indicator POS (POS)     Source WND     Site arm     Culture Result - (A)     Gram Stain Result Rare WBCs.  Rare Gram positive cocci.       Culture Result (A)      Methicillin Resistant Staphylococcus aureus  Moderate growth         Susceptibility    Methicillin resistant staphylococcus aureus - MARY ELLEN     Azithromycin  Resistant mcg/mL     Clindamycin  Resistant mcg/mL     Cefazolin  " Resistant mcg/mL     Cefepime  Resistant mcg/mL     Ceftaroline  Sensitive mcg/mL     Daptomycin  Sensitive mcg/mL     Ampicillin/sulbactam  Resistant mcg/mL     Erythromycin  Resistant mcg/mL     Vancomycin  Sensitive mcg/mL     Oxacillin  Resistant mcg/mL     Trimeth/Sulfa  Sensitive mcg/mL     Tetracycline  Sensitive mcg/mL   GRAM STAIN    Specimen: Wound   Result Value Ref Range    Significant Indicator .     Source WND     Site arm     Gram Stain Result Rare WBCs.  Rare Gram positive cocci.          COURSE & MEDICAL DECISION MAKING  Patient arrives for evaluation of what appears to be an area of cellulitis possibly as a result of a small skin defect while shaving.  Patient does not appear septic or toxic and there is no evidence that the infection has spread to the knee joint itself.  She does not have any signs of DVT and I feel she can be treated empirically with clindamycin as she has tolerated this in the past.  She did state understanding that C. difficile colitis is a possible side effect and if she starts having diarrhea she needs to stop it immediately and return to the emergency department for further evaluation.  Likewise, or if her symptoms worsen or change in any way she will need to return for reevaluation.  at this point I do not feel labs or imaging will benefit the patient or  emergently.    FINAL IMPRESSION  1. Cellulitis of right lower extremity        Electronically signed by: William Mcintosh M.D., 12/3/2022 3:59 PM

## 2022-12-04 NOTE — ED NOTES
Discharge instructions given and discussed. RX for diflucan and clindamycin  given and pt educated. Pt educated to come back to ER for new or worsening symptoms and follow up with PCP as instructed. Pt verbalized understanding. VSS. Pt  Discharged in stable condition.

## 2023-06-17 ENCOUNTER — HOSPITAL ENCOUNTER (EMERGENCY)
Facility: MEDICAL CENTER | Age: 23
End: 2023-06-17
Payer: MEDICAID

## 2023-06-17 VITALS
DIASTOLIC BLOOD PRESSURE: 79 MMHG | HEIGHT: 64 IN | SYSTOLIC BLOOD PRESSURE: 122 MMHG | WEIGHT: 134.7 LBS | TEMPERATURE: 97.8 F | RESPIRATION RATE: 14 BRPM | OXYGEN SATURATION: 98 % | HEART RATE: 112 BPM | BODY MASS INDEX: 23 KG/M2

## 2023-06-17 LAB
APPEARANCE UR: CLEAR
BACTERIA #/AREA URNS HPF: ABNORMAL /HPF
BILIRUB UR QL STRIP.AUTO: NEGATIVE
C TRACH DNA SPEC QL NAA+PROBE: POSITIVE
COLOR UR: YELLOW
EPI CELLS #/AREA URNS HPF: ABNORMAL /HPF
GLUCOSE UR STRIP.AUTO-MCNC: NEGATIVE MG/DL
HCG UR QL: NEGATIVE
HYALINE CASTS #/AREA URNS LPF: ABNORMAL /LPF
KETONES UR STRIP.AUTO-MCNC: NEGATIVE MG/DL
LEUKOCYTE ESTERASE UR QL STRIP.AUTO: ABNORMAL
MICRO URNS: ABNORMAL
N GONORRHOEA DNA SPEC QL NAA+PROBE: NEGATIVE
NITRITE UR QL STRIP.AUTO: NEGATIVE
PH UR STRIP.AUTO: 6.5 [PH] (ref 5–8)
PROT UR QL STRIP: NEGATIVE MG/DL
RBC # URNS HPF: ABNORMAL /HPF
RBC UR QL AUTO: NEGATIVE
SP GR UR STRIP.AUTO: 1.01
SPECIMEN SOURCE: ABNORMAL
UROBILINOGEN UR STRIP.AUTO-MCNC: 0.2 MG/DL
WBC #/AREA URNS HPF: ABNORMAL /HPF

## 2023-06-17 PROCEDURE — 87491 CHLMYD TRACH DNA AMP PROBE: CPT

## 2023-06-17 PROCEDURE — 81025 URINE PREGNANCY TEST: CPT

## 2023-06-17 PROCEDURE — 81001 URINALYSIS AUTO W/SCOPE: CPT

## 2023-06-17 PROCEDURE — 302449 STATCHG TRIAGE ONLY (STATISTIC)

## 2023-06-17 PROCEDURE — 87591 N.GONORRHOEAE DNA AMP PROB: CPT

## 2023-06-17 ASSESSMENT — FIBROSIS 4 INDEX: FIB4 SCORE: 0.58

## 2023-06-17 NOTE — ED TRIAGE NOTES
Phoenix Masters  23 y.o.  female  Chief Complaint   Patient presents with    Pelvic Pain     Pt c/o foul smelling vaginal discharge x 3 days, mild dysuria, some mild abdominal cramping.     Exposure to STD     Pt had recent unprotected intercourse 1 week ago.      Patient educated on triage process, to alert staff if any changes in condition.

## 2023-06-17 NOTE — ED NOTES
Pt approached ED staff with second urine sample. Second urine sent per triage RN request. Pt stated she wished to leave and return tomorrow morning. ED tech informed pt of the risks of leaving without being seen including but not limited to further disability/injury including death. Pt stated she understood the risks and signed LWBS. Pt advised to return with any changes.

## 2023-06-23 NOTE — ED NOTES
"ED Positive Culture Follow-up/Notification Note:    Date: 6/22/2023     Patient seen in the ED on 6/17/2023 for pelvic pain, vaginal discharge, abdominal cramping, and exposure to STI. Patient gave a urine sample and left the ED without being seen by a provider.. No diagnosis found.   Discharge Medication List as of 6/17/2023  1:07 AM          Allergies: Minocycline     Vitals:    06/17/23 0023 06/17/23 0035   BP: 122/79    Pulse: (!) 112    Resp: 14    Temp: 36.6 °C (97.8 °F)    TempSrc: Temporal    SpO2: 98%    Weight:  61.1 kg (134 lb 11.2 oz)   Height:  1.626 m (5' 4\")       Final cultures:   Results       Procedure Component Value Units Date/Time    Chlamydia/GC, PCR (Urine) [[id number]]  (Abnormal) Collected: 06/17/23 0034    Order Status: Completed Specimen: Urine Updated: 06/17/23 1758     C. trachomatis by PCR POSITIVE     Gc By Dna Probe Negative     Source Urine    URINALYSIS [[id number]]  (Abnormal) Collected: 06/17/23 0034    Order Status: Completed Specimen: Urine Updated: 06/17/23 0141     Color Yellow     Character Clear     Specific Gravity 1.015     Ph 6.5     Glucose Negative mg/dL      Ketones Negative mg/dL      Protein Negative mg/dL      Bilirubin Negative     Urobilinogen, Urine 0.2     Nitrite Negative     Leukocyte Esterase Small     Occult Blood Negative     Micro Urine Req Microscopic            Plan:   Patient is positive for chlamydia, negative for gonorrhea. Not on treatment. Not tested for HIV or syphilis. Not seen by a provider and PID has not been ruled out. I called the patient to discuss the result and ask she return to the ED, see her PCP, or go to [facility] for further evaluation. Patient states she went to the [facility] ED and was treated there with azithromycin 1 g, given her allergy to minocycline. No changes required.    Ajay Norman, PharmD   "

## 2024-08-13 NOTE — ED TRIAGE NOTES
21 yr old female to triage  Chief Complaint   Patient presents with   • Arm Pain     Patient report of redness, swelling and pain to the right antecubital the past 5-6 days   • Arm Swelling   • Leg Problem     redness and slight pain to the right lateral thigh the past 2-3 days.          Name band;